# Patient Record
Sex: FEMALE | Race: ASIAN | Employment: UNEMPLOYED | URBAN - METROPOLITAN AREA
[De-identification: names, ages, dates, MRNs, and addresses within clinical notes are randomized per-mention and may not be internally consistent; named-entity substitution may affect disease eponyms.]

---

## 2017-10-14 LAB
AMB EXT ANTIBODY SCREEN: NEGATIVE
AMB EXT CHLAMYDIA NUCLEIC ACID AMP, TMA: NEGATIVE
AMB EXT HBSAG SCREEN: NEGATIVE
AMB EXT HEMATOCRIT: 35.1
AMB EXT HEMOGLOBIN: 11.8
AMB EXT HIV AG AB COMBO: NEGATIVE
AMB EXT MCV: 94.9
AMB EXT PLATELETS: 19.6
AMB EXT RH FACTOR: POSITIVE

## 2018-01-08 ENCOUNTER — OFFICE VISIT (OUTPATIENT)
Dept: OBGYN CLINIC | Facility: CLINIC | Age: 28
End: 2018-01-08

## 2018-01-08 ENCOUNTER — APPOINTMENT (OUTPATIENT)
Dept: OBGYN CLINIC | Facility: CLINIC | Age: 28
End: 2018-01-08

## 2018-01-08 VITALS
HEART RATE: 78 BPM | WEIGHT: 151 LBS | HEIGHT: 65.5 IN | DIASTOLIC BLOOD PRESSURE: 58 MMHG | SYSTOLIC BLOOD PRESSURE: 110 MMHG | BODY MASS INDEX: 24.85 KG/M2

## 2018-01-08 DIAGNOSIS — Z34.02 ENCOUNTER FOR SUPERVISION OF NORMAL FIRST PREGNANCY IN SECOND TRIMESTER: Primary | ICD-10-CM

## 2018-01-08 DIAGNOSIS — Z36.9 ENCOUNTER FOR ANTENATAL SCREENING: ICD-10-CM

## 2018-01-08 LAB
BILIRUBIN: NEGATIVE
GLUCOSE (URINE DIPSTICK): NEGATIVE MG/DL
KETONES (URINE DIPSTICK): NEGATIVE MG/DL
MULTISTIX LOT#: NORMAL NUMERIC
NITRITE, URINE: NEGATIVE
OCCULT BLOOD: NEGATIVE
PH, URINE: 7 (ref 4.5–8)
PROTEIN (URINE DIPSTICK): NEGATIVE MG/DL
SPECIFIC GRAVITY: 1.01 (ref 1–1.03)
URINE-COLOR: YELLOW
UROBILINOGEN,SEMI-QN: 0.2 MG/DL (ref 0–1.9)

## 2018-01-08 PROCEDURE — 81002 URINALYSIS NONAUTO W/O SCOPE: CPT | Performed by: OBSTETRICS & GYNECOLOGY

## 2018-01-08 PROCEDURE — 87086 URINE CULTURE/COLONY COUNT: CPT | Performed by: OBSTETRICS & GYNECOLOGY

## 2018-01-08 PROCEDURE — 99204 OFFICE O/P NEW MOD 45 MIN: CPT | Performed by: OBSTETRICS & GYNECOLOGY

## 2018-01-08 NOTE — PROGRESS NOTES
Patient received prenatal care in [place] and [facility] . By LMP [facility] is 5/5/17 by records from [place]. Has had US  Since with last one in Nov but no structural survey done . OB US ordered  For today. Has had some BW and RH pos. Glucola  And CBC ordered. . rtc 4 wk

## 2018-01-09 LAB
C TRACH DNA SPEC QL NAA+PROBE: NEGATIVE
N GONORRHOEA DNA SPEC QL NAA+PROBE: NEGATIVE

## 2018-01-12 ENCOUNTER — TELEPHONE (OUTPATIENT)
Dept: OBGYN CLINIC | Facility: CLINIC | Age: 28
End: 2018-01-12

## 2018-01-12 NOTE — TELEPHONE ENCOUNTER
PC with spouse Peyton Heller who is on her hippa. Aware a  is needed to verify the lab results they brought us dated 10/14/2017 are his wifes. They are in [facility] and unable to ascertain if these are the patient.  A  is needed to translate the nam

## 2018-01-29 ENCOUNTER — LAB ENCOUNTER (OUTPATIENT)
Dept: LAB | Facility: HOSPITAL | Age: 28
End: 2018-01-29
Attending: OBSTETRICS & GYNECOLOGY
Payer: MEDICAID

## 2018-01-29 DIAGNOSIS — Z36.9 ENCOUNTER FOR ANTENATAL SCREENING: ICD-10-CM

## 2018-01-29 LAB
BASOPHILS # BLD AUTO: 0.01 X10(3) UL (ref 0–0.1)
BASOPHILS NFR BLD AUTO: 0.1 %
EOSINOPHIL # BLD AUTO: 0.03 X10(3) UL (ref 0–0.3)
EOSINOPHIL NFR BLD AUTO: 0.4 %
ERYTHROCYTE [DISTWIDTH] IN BLOOD BY AUTOMATED COUNT: 12.5 % (ref 11.5–16)
GLUCOSE 1H P GLC SERPL-MCNC: 103 MG/DL
HCT VFR BLD AUTO: 32.6 % (ref 34–50)
HGB BLD-MCNC: 10.9 G/DL (ref 12–16)
IMMATURE GRANULOCYTE COUNT: 0.03 X10(3) UL (ref 0–1)
IMMATURE GRANULOCYTE RATIO %: 0.4 %
LYMPHOCYTES # BLD AUTO: 1.52 X10(3) UL (ref 0.9–4)
LYMPHOCYTES NFR BLD AUTO: 21.5 %
MCH RBC QN AUTO: 34 PG (ref 27–33.2)
MCHC RBC AUTO-ENTMCNC: 33.4 G/DL (ref 31–37)
MCV RBC AUTO: 101.6 FL (ref 81–100)
MONOCYTES # BLD AUTO: 0.58 X10(3) UL (ref 0.1–0.6)
MONOCYTES NFR BLD AUTO: 8.2 %
NEUTROPHIL ABS PRELIM: 4.91 X10 (3) UL (ref 1.3–6.7)
NEUTROPHILS # BLD AUTO: 4.91 X10(3) UL (ref 1.3–6.7)
NEUTROPHILS NFR BLD AUTO: 69.4 %
PLATELET # BLD AUTO: 197 10(3)UL (ref 150–450)
RBC # BLD AUTO: 3.21 X10(6)UL (ref 3.8–5.1)
RED CELL DISTRIBUTION WIDTH-SD: 46.3 FL (ref 35.1–46.3)
WBC # BLD AUTO: 7.1 X10(3) UL (ref 4–13)

## 2018-01-29 PROCEDURE — 36415 COLL VENOUS BLD VENIPUNCTURE: CPT

## 2018-01-29 PROCEDURE — 85025 COMPLETE CBC W/AUTO DIFF WBC: CPT

## 2018-01-29 PROCEDURE — 82950 GLUCOSE TEST: CPT

## 2018-01-30 ENCOUNTER — TELEPHONE (OUTPATIENT)
Dept: OBGYN CLINIC | Facility: CLINIC | Age: 28
End: 2018-01-30

## 2018-01-30 NOTE — TELEPHONE ENCOUNTER
Spoke with Patricia Queen regarding billing for DOS 1/8/18 - patient is self-pay and had questions about how discount was applied.  Answered all questions and informed Patricia Queen that if patient would like us to speak with her any further, the HIPAA form needs to be upda

## 2018-01-30 NOTE — TELEPHONE ENCOUNTER
Pt came into the office with a friend whos name is Sherren Roles , gave verbal okay for us to speak with her in regards to  billing and treatment concerns. Pt does not speak english.

## 2018-01-30 NOTE — TELEPHONE ENCOUNTER
Placed call to Melvin Knowles to resolve this matter. Melvin Knowles asked to call me back in a few minutes when Jeanie Lesch is available.

## 2018-02-05 ENCOUNTER — OFFICE VISIT (OUTPATIENT)
Dept: OBGYN CLINIC | Facility: CLINIC | Age: 28
End: 2018-02-05

## 2018-02-05 ENCOUNTER — MED REC SCAN ONLY (OUTPATIENT)
Dept: OBGYN CLINIC | Facility: CLINIC | Age: 28
End: 2018-02-05

## 2018-02-05 VITALS
HEIGHT: 65.5 IN | SYSTOLIC BLOOD PRESSURE: 100 MMHG | WEIGHT: 157 LBS | HEART RATE: 68 BPM | BODY MASS INDEX: 25.84 KG/M2 | DIASTOLIC BLOOD PRESSURE: 64 MMHG

## 2018-02-05 DIAGNOSIS — Z36.9 ENCOUNTER FOR ANTENATAL SCREENING OF MOTHER: Primary | ICD-10-CM

## 2018-02-05 PROCEDURE — 99213 OFFICE O/P EST LOW 20 MIN: CPT | Performed by: OBSTETRICS & GYNECOLOGY

## 2018-02-05 NOTE — PROGRESS NOTES
Patient here without chief complaint she is doing well. She was a bit anemic and will start taking iron. Return to office in 4 weeks.

## 2018-03-05 ENCOUNTER — OFFICE VISIT (OUTPATIENT)
Dept: OBGYN CLINIC | Facility: CLINIC | Age: 28
End: 2018-03-05

## 2018-03-05 VITALS
WEIGHT: 159 LBS | SYSTOLIC BLOOD PRESSURE: 112 MMHG | HEIGHT: 65.5 IN | DIASTOLIC BLOOD PRESSURE: 60 MMHG | BODY MASS INDEX: 26.17 KG/M2 | HEART RATE: 68 BPM

## 2018-03-05 DIAGNOSIS — Z36.89 ENCOUNTER FOR ULTRASOUND TO ASSESS INTERVAL GROWTH OF FETUS: Primary | ICD-10-CM

## 2018-03-05 PROCEDURE — 0502F SUBSEQUENT PRENATAL CARE: CPT | Performed by: OBSTETRICS & GYNECOLOGY

## 2018-03-05 NOTE — PROGRESS NOTES
Doing well no chief complaints. No contractions. Return to office in 2 weeks. Wishes to have another ultrasound done. Order placed.

## 2018-03-19 ENCOUNTER — MED REC SCAN ONLY (OUTPATIENT)
Dept: OBGYN CLINIC | Facility: CLINIC | Age: 28
End: 2018-03-19

## 2018-03-19 ENCOUNTER — APPOINTMENT (OUTPATIENT)
Dept: OBGYN CLINIC | Facility: CLINIC | Age: 28
End: 2018-03-19

## 2018-03-19 ENCOUNTER — OFFICE VISIT (OUTPATIENT)
Dept: OBGYN CLINIC | Facility: CLINIC | Age: 28
End: 2018-03-19

## 2018-03-19 VITALS
BODY MASS INDEX: 26.83 KG/M2 | DIASTOLIC BLOOD PRESSURE: 70 MMHG | HEIGHT: 65.5 IN | SYSTOLIC BLOOD PRESSURE: 110 MMHG | WEIGHT: 163 LBS | HEART RATE: 80 BPM

## 2018-03-19 DIAGNOSIS — Z34.03 ENCOUNTER FOR SUPERVISION OF NORMAL FIRST PREGNANCY IN THIRD TRIMESTER: Primary | ICD-10-CM

## 2018-03-19 DIAGNOSIS — Z36.89 ENCOUNTER FOR ULTRASOUND TO ASSESS INTERVAL GROWTH OF FETUS: ICD-10-CM

## 2018-03-19 PROCEDURE — 0502F SUBSEQUENT PRENATAL CARE: CPT | Performed by: OBSTETRICS & GYNECOLOGY

## 2018-03-19 PROCEDURE — 76816 OB US FOLLOW-UP PER FETUS: CPT | Performed by: OBSTETRICS & GYNECOLOGY

## 2018-03-19 NOTE — PROGRESS NOTES
Ultrasound done today 3/19/2000 1833 weeks 1 day MILLER 5/6/2018 with dates cephalic normal LAUREN.   Return to office in 2 weeks

## 2018-03-30 ENCOUNTER — OFFICE VISIT (OUTPATIENT)
Dept: OBGYN CLINIC | Facility: CLINIC | Age: 28
End: 2018-03-30

## 2018-03-30 VITALS
BODY MASS INDEX: 26.5 KG/M2 | DIASTOLIC BLOOD PRESSURE: 68 MMHG | HEART RATE: 75 BPM | SYSTOLIC BLOOD PRESSURE: 110 MMHG | HEIGHT: 65.5 IN | WEIGHT: 161 LBS

## 2018-03-30 DIAGNOSIS — Z34.03 ENCOUNTER FOR SUPERVISION OF NORMAL FIRST PREGNANCY IN THIRD TRIMESTER: Primary | ICD-10-CM

## 2018-03-30 PROCEDURE — 0502F SUBSEQUENT PRENATAL CARE: CPT | Performed by: OBSTETRICS & GYNECOLOGY

## 2018-04-06 ENCOUNTER — OFFICE VISIT (OUTPATIENT)
Dept: OBGYN CLINIC | Facility: CLINIC | Age: 28
End: 2018-04-06

## 2018-04-06 VITALS
SYSTOLIC BLOOD PRESSURE: 108 MMHG | HEART RATE: 68 BPM | DIASTOLIC BLOOD PRESSURE: 68 MMHG | WEIGHT: 163 LBS | BODY MASS INDEX: 26.83 KG/M2 | HEIGHT: 65.5 IN

## 2018-04-06 DIAGNOSIS — Z36.85 ANTENATAL SCREENING FOR STREPTOCOCCUS B: ICD-10-CM

## 2018-04-06 DIAGNOSIS — Z34.03 ENCOUNTER FOR SUPERVISION OF NORMAL FIRST PREGNANCY IN THIRD TRIMESTER: Primary | ICD-10-CM

## 2018-04-06 PROCEDURE — 0502F SUBSEQUENT PRENATAL CARE: CPT | Performed by: NURSE PRACTITIONER

## 2018-04-06 PROCEDURE — 87653 STREP B DNA AMP PROBE: CPT | Performed by: NURSE PRACTITIONER

## 2018-04-06 PROCEDURE — 87081 CULTURE SCREEN ONLY: CPT | Performed by: NURSE PRACTITIONER

## 2018-04-06 NOTE — PROGRESS NOTES
ALEE  Doing well, +FM.  Denies VB/LOF/uctx  Mode of delivery:  anticipated  Declines Tdap   GBS collected   RTC 1 week

## 2018-04-13 ENCOUNTER — OFFICE VISIT (OUTPATIENT)
Dept: OBGYN CLINIC | Facility: CLINIC | Age: 28
End: 2018-04-13

## 2018-04-13 ENCOUNTER — APPOINTMENT (OUTPATIENT)
Dept: LAB | Age: 28
End: 2018-04-13
Attending: OBSTETRICS & GYNECOLOGY
Payer: MEDICAID

## 2018-04-13 VITALS
HEIGHT: 65.5 IN | WEIGHT: 165 LBS | SYSTOLIC BLOOD PRESSURE: 96 MMHG | DIASTOLIC BLOOD PRESSURE: 58 MMHG | BODY MASS INDEX: 27.16 KG/M2 | HEART RATE: 96 BPM

## 2018-04-13 DIAGNOSIS — Z36.9 ENCOUNTER FOR ANTENATAL SCREENING OF MOTHER: ICD-10-CM

## 2018-04-13 DIAGNOSIS — Z34.03 ENCOUNTER FOR SUPERVISION OF NORMAL FIRST PREGNANCY IN THIRD TRIMESTER: Primary | ICD-10-CM

## 2018-04-13 PROCEDURE — 0502F SUBSEQUENT PRENATAL CARE: CPT | Performed by: OBSTETRICS & GYNECOLOGY

## 2018-04-13 PROCEDURE — 87389 HIV-1 AG W/HIV-1&-2 AB AG IA: CPT

## 2018-04-13 PROCEDURE — 36415 COLL VENOUS BLD VENIPUNCTURE: CPT

## 2018-04-13 NOTE — PROGRESS NOTES
Signs and symptoms of labor discussed. Patient still needs to get HIV done. Return to office in 1 week.

## 2018-04-20 ENCOUNTER — TELEPHONE (OUTPATIENT)
Dept: OBGYN CLINIC | Facility: CLINIC | Age: 28
End: 2018-04-20

## 2018-04-20 ENCOUNTER — OFFICE VISIT (OUTPATIENT)
Dept: OBGYN CLINIC | Facility: CLINIC | Age: 28
End: 2018-04-20

## 2018-04-20 VITALS
DIASTOLIC BLOOD PRESSURE: 58 MMHG | WEIGHT: 167 LBS | HEART RATE: 88 BPM | BODY MASS INDEX: 27.49 KG/M2 | HEIGHT: 65.5 IN | SYSTOLIC BLOOD PRESSURE: 102 MMHG

## 2018-04-20 DIAGNOSIS — O36.5990 POOR FETAL GROWTH AFFECTING MANAGEMENT OF MOTHER, ANTEPARTUM, SINGLE OR UNSPECIFIED FETUS: Primary | ICD-10-CM

## 2018-04-20 DIAGNOSIS — O36.5930 POOR FETAL GROWTH AFFECTING MANAGEMENT OF MOTHER IN THIRD TRIMESTER, SINGLE OR UNSPECIFIED FETUS: ICD-10-CM

## 2018-04-20 DIAGNOSIS — Z34.03 ENCOUNTER FOR SUPERVISION OF NORMAL FIRST PREGNANCY IN THIRD TRIMESTER: Primary | ICD-10-CM

## 2018-04-20 PROCEDURE — 0500F INITIAL PRENATAL CARE VISIT: CPT | Performed by: OBSTETRICS & GYNECOLOGY

## 2018-04-20 RX ORDER — MELATONIN
325
COMMUNITY

## 2018-04-20 NOTE — TELEPHONE ENCOUNTER
Patient aware ob ultrasound is scheduled for 4/23/2018 at 1:00 pm. Needs to drink 8 oz water prior. Understands.

## 2018-04-20 NOTE — TELEPHONE ENCOUNTER
PC with patient. They are unable to schedule US until next Friday. Spoke with central scheduling. It would have to be stat to get her in earlier.

## 2018-04-20 NOTE — TELEPHONE ENCOUNTER
PC with patient. Aware dr wants her ob ultrasound and alla appointment moved up. To have US at hospital and alla appointment is for 4/23/2018.

## 2018-04-23 ENCOUNTER — HOSPITAL ENCOUNTER (OUTPATIENT)
Dept: ULTRASOUND IMAGING | Facility: HOSPITAL | Age: 28
Discharge: HOME | End: 2018-04-23
Attending: OBSTETRICS & GYNECOLOGY
Payer: MEDICAID

## 2018-04-23 ENCOUNTER — OFFICE VISIT (OUTPATIENT)
Dept: OBGYN CLINIC | Facility: CLINIC | Age: 28
End: 2018-04-23

## 2018-04-23 VITALS
BODY MASS INDEX: 27.26 KG/M2 | SYSTOLIC BLOOD PRESSURE: 112 MMHG | WEIGHT: 165.63 LBS | HEART RATE: 80 BPM | HEIGHT: 65.5 IN | DIASTOLIC BLOOD PRESSURE: 66 MMHG

## 2018-04-23 DIAGNOSIS — Z34.03 ENCOUNTER FOR SUPERVISION OF NORMAL FIRST PREGNANCY IN THIRD TRIMESTER: Primary | ICD-10-CM

## 2018-04-23 DIAGNOSIS — O36.5990 POOR FETAL GROWTH AFFECTING MANAGEMENT OF MOTHER, ANTEPARTUM, SINGLE OR UNSPECIFIED FETUS: ICD-10-CM

## 2018-04-23 DIAGNOSIS — O32.2XX0 TRANSVERSE LIE OF FETUS, SINGLE OR UNSPECIFIED FETUS: ICD-10-CM

## 2018-04-23 PROCEDURE — 76805 OB US >/= 14 WKS SNGL FETUS: CPT | Performed by: OBSTETRICS & GYNECOLOGY

## 2018-04-23 PROCEDURE — 76817 TRANSVAGINAL US OBSTETRIC: CPT | Performed by: OBSTETRICS & GYNECOLOGY

## 2018-04-23 PROCEDURE — 0502F SUBSEQUENT PRENATAL CARE: CPT | Performed by: OBSTETRICS & GYNECOLOGY

## 2018-04-23 NOTE — PROGRESS NOTES
Ultrasound done at [facility] on 4/23/2018 size equal dates. Baby is however transverse lie. Discussed with radiologist hard to tell whether it is back up or  back down. Will repeat US next week. Aware if go in labor will be C/S if transverse.  rtc 1

## 2018-04-30 ENCOUNTER — OFFICE VISIT (OUTPATIENT)
Dept: OBGYN CLINIC | Facility: CLINIC | Age: 28
End: 2018-04-30

## 2018-04-30 ENCOUNTER — APPOINTMENT (OUTPATIENT)
Dept: OBGYN CLINIC | Facility: CLINIC | Age: 28
End: 2018-04-30

## 2018-04-30 VITALS
WEIGHT: 170 LBS | SYSTOLIC BLOOD PRESSURE: 122 MMHG | HEIGHT: 65.5 IN | BODY MASS INDEX: 27.98 KG/M2 | DIASTOLIC BLOOD PRESSURE: 64 MMHG | HEART RATE: 84 BPM

## 2018-04-30 DIAGNOSIS — O36.5930 POOR FETAL GROWTH AFFECTING MANAGEMENT OF MOTHER IN THIRD TRIMESTER, SINGLE OR UNSPECIFIED FETUS: Primary | ICD-10-CM

## 2018-04-30 PROCEDURE — 59025 FETAL NON-STRESS TEST: CPT | Performed by: OBSTETRICS & GYNECOLOGY

## 2018-04-30 PROCEDURE — 0502F SUBSEQUENT PRENATAL CARE: CPT | Performed by: OBSTETRICS & GYNECOLOGY

## 2018-04-30 NOTE — PROGRESS NOTES
Clinically s<d. Will setup IOL at around 39 wks . IOL for 5/3 at 930am. Pt aware. NST reactive. US with AC at 7%.  Fetal kick count

## 2018-05-01 ENCOUNTER — TELEPHONE (OUTPATIENT)
Dept: OBGYN UNIT | Facility: HOSPITAL | Age: 28
End: 2018-05-01

## 2018-05-01 NOTE — PROGRESS NOTES
Pt does not speak [place]. Confirmed time of arrival for induction with translating friend, Zane Kehr.  Also encouraged pt to remain npo after midnight the night before, in case baby is not vtx presentation on arrival.

## 2018-05-03 ENCOUNTER — HOSPITAL ENCOUNTER (INPATIENT)
Facility: HOSPITAL | Age: 28
LOS: 3 days | Discharge: HOME | End: 2018-05-06
Attending: OBSTETRICS & GYNECOLOGY | Admitting: OBSTETRICS & GYNECOLOGY
Payer: MEDICAID

## 2018-05-03 ENCOUNTER — HOSPITAL ENCOUNTER (INPATIENT)
Dept: OBGYN CLINIC | Facility: HOSPITAL | Age: 28
Discharge: HOME | End: 2018-05-03
Payer: MEDICAID

## 2018-05-03 PROBLEM — Z34.90 NORMAL PREGNANCY: Status: ACTIVE | Noted: 2018-05-03

## 2018-05-03 PROCEDURE — 3E033VJ INTRODUCTION OF OTHER HORMONE INTO PERIPHERAL VEIN, PERCUTANEOUS APPROACH: ICD-10-PCS | Performed by: OBSTETRICS & GYNECOLOGY

## 2018-05-03 RX ORDER — TERBUTALINE SULFATE 1 MG/ML
INJECTION, SOLUTION SUBCUTANEOUS
Status: COMPLETED
Start: 2018-05-03 | End: 2018-05-03

## 2018-05-03 RX ORDER — SODIUM CHLORIDE, SODIUM LACTATE, POTASSIUM CHLORIDE, CALCIUM CHLORIDE 600; 310; 30; 20 MG/100ML; MG/100ML; MG/100ML; MG/100ML
INJECTION, SOLUTION INTRAVENOUS CONTINUOUS
Status: DISCONTINUED | OUTPATIENT
Start: 2018-05-03 | End: 2018-05-06

## 2018-05-03 RX ORDER — TERBUTALINE SULFATE 1 MG/ML
0.25 INJECTION, SOLUTION SUBCUTANEOUS AS NEEDED
Status: DISCONTINUED | OUTPATIENT
Start: 2018-05-03 | End: 2018-05-06

## 2018-05-03 RX ORDER — EPHEDRINE SULFATE 50 MG/ML
5 INJECTION, SOLUTION INTRAVENOUS AS NEEDED
Status: DISCONTINUED | OUTPATIENT
Start: 2018-05-03 | End: 2018-05-06

## 2018-05-03 RX ORDER — NALBUPHINE HCL 10 MG/ML
2.5 AMPUL (ML) INJECTION
Status: DISCONTINUED | OUTPATIENT
Start: 2018-05-03 | End: 2018-05-06

## 2018-05-03 RX ORDER — IBUPROFEN 600 MG/1
600 TABLET ORAL ONCE AS NEEDED
Status: DISCONTINUED | OUTPATIENT
Start: 2018-05-03 | End: 2018-05-05

## 2018-05-03 RX ORDER — DEXTROSE, SODIUM CHLORIDE, SODIUM LACTATE, POTASSIUM CHLORIDE, AND CALCIUM CHLORIDE 5; .6; .31; .03; .02 G/100ML; G/100ML; G/100ML; G/100ML; G/100ML
INJECTION, SOLUTION INTRAVENOUS AS NEEDED
Status: DISCONTINUED | OUTPATIENT
Start: 2018-05-03 | End: 2018-05-06

## 2018-05-03 RX ORDER — TRISODIUM CITRATE DIHYDRATE AND CITRIC ACID MONOHYDRATE 500; 334 MG/5ML; MG/5ML
30 SOLUTION ORAL AS NEEDED
Status: DISCONTINUED | OUTPATIENT
Start: 2018-05-03 | End: 2018-05-06

## 2018-05-03 NOTE — PROGRESS NOTES
Report given to [facility].  Patient left in stable condition

## 2018-05-03 NOTE — H&P
[facility] Patient Status:  Inpatient    1990 MRN [id number]   Location [address] Attending Humaira Noble MD   Hosp Day # 0 PCP No primary care provider on file.      Subjective:   IOL for

## 2018-05-04 PROCEDURE — 0UQMXZZ REPAIR VULVA, EXTERNAL APPROACH: ICD-10-PCS | Performed by: OBSTETRICS & GYNECOLOGY

## 2018-05-04 PROCEDURE — 0KQM0ZZ REPAIR PERINEUM MUSCLE, OPEN APPROACH: ICD-10-PCS | Performed by: OBSTETRICS & GYNECOLOGY

## 2018-05-04 PROCEDURE — 59409 OBSTETRICAL CARE: CPT | Performed by: OBSTETRICS & GYNECOLOGY

## 2018-05-04 RX ORDER — IBUPROFEN 600 MG/1
600 TABLET ORAL EVERY 6 HOURS
Status: DISCONTINUED | OUTPATIENT
Start: 2018-05-04 | End: 2018-05-05

## 2018-05-04 RX ORDER — ACETAMINOPHEN 500 MG
1000 TABLET ORAL EVERY 6 HOURS PRN
Status: DISCONTINUED | OUTPATIENT
Start: 2018-05-04 | End: 2018-05-06

## 2018-05-04 RX ORDER — DOCUSATE SODIUM 100 MG/1
100 CAPSULE, LIQUID FILLED ORAL
Status: DISCONTINUED | OUTPATIENT
Start: 2018-05-04 | End: 2018-05-06

## 2018-05-04 RX ORDER — ACETAMINOPHEN 325 MG/1
650 TABLET ORAL EVERY 4 HOURS PRN
Status: DISCONTINUED | OUTPATIENT
Start: 2018-05-04 | End: 2018-05-06

## 2018-05-04 RX ORDER — HYDROCODONE BITARTRATE AND ACETAMINOPHEN 5; 325 MG/1; MG/1
1 TABLET ORAL EVERY 4 HOURS PRN
Status: DISCONTINUED | OUTPATIENT
Start: 2018-05-04 | End: 2018-05-06

## 2018-05-04 RX ORDER — BISACODYL 10 MG
10 SUPPOSITORY, RECTAL RECTAL ONCE AS NEEDED
Status: ACTIVE | OUTPATIENT
Start: 2018-05-04 | End: 2018-05-04

## 2018-05-04 RX ORDER — SIMETHICONE 80 MG
80 TABLET,CHEWABLE ORAL 3 TIMES DAILY PRN
Status: DISCONTINUED | OUTPATIENT
Start: 2018-05-04 | End: 2018-05-06

## 2018-05-04 RX ORDER — ZOLPIDEM TARTRATE 5 MG/1
5 TABLET ORAL NIGHTLY PRN
Status: DISCONTINUED | OUTPATIENT
Start: 2018-05-04 | End: 2018-05-06

## 2018-05-04 RX ORDER — ACETAMINOPHEN 500 MG
TABLET ORAL
Status: COMPLETED
Start: 2018-05-04 | End: 2018-05-04

## 2018-05-04 RX ORDER — HYDROCODONE BITARTRATE AND ACETAMINOPHEN 5; 325 MG/1; MG/1
2 TABLET ORAL EVERY 4 HOURS PRN
Status: DISCONTINUED | OUTPATIENT
Start: 2018-05-04 | End: 2018-05-06

## 2018-05-04 NOTE — PROGRESS NOTES
Report given to Alexi Tovar RN. Patient laying left sided with peanut ball in place. She declines need for pain medication at this time.

## 2018-05-04 NOTE — L&D DELIVERY NOTE
Chantell Gordon, Girl  [[id number]]    Labor Events     labor?:  No   steroids?:  None  Antibiotics received during labor?:  Yes  Antibiotics (enter # doses in comment):  ampicillin  Rupture date/time:  5/3/2018 1030     Rupture type:  AROM  Fluid colo Abdominal circum. :  29 cm       Placenta    Date/time:  2018 1203  Removal:  Spontaneous  Appearance:  Intact  Disposition:  held for future pathology     Apgars    Living status:  Living   Apgar Scoring Key:     0 1 2    Skin color Blue or pale A and suctioned. The cord was doubly clamped and cut. The cord blood was sampled. IV pitocin and gentle uterine massage helped delivery of the placenta. The perineal laceration was repaired in layers.   The vaginal portion was approximated with a 2.0 rapi

## 2018-05-04 NOTE — PROGRESS NOTES
Assisted to bathroom by RN and OBT, ambulated with a steady gait, voided and pericare given and pad changed. Pt shown use of tucks and spray, seems to understand.    very supportive at bedside

## 2018-05-04 NOTE — PROGRESS NOTES
Pt and infant daughter admitted to room with fob accompanying. Pt. And fob verbalized understanding to call for nurse when patient wants to get out of the bed for any reason so nurse can assist.  Pt. And S.O.  Verbalized understanding of room number and ho

## 2018-05-04 NOTE — PROGRESS NOTES
Had episode of bradycardia with hyperstim and was given terb. contractin on own at 5 mins. Pit left off for several hours and restarted. On ampicillin since 10 pm  As ROM at 1030am.SVE now 8-9 cm. Pitocin on 8mu. FHT reactive.

## 2018-05-05 RX ORDER — IBUPROFEN 600 MG/1
600 TABLET ORAL EVERY 6 HOURS
Status: DISCONTINUED | OUTPATIENT
Start: 2018-05-05 | End: 2018-05-05

## 2018-05-05 RX ORDER — IBUPROFEN 600 MG/1
600 TABLET ORAL EVERY 6 HOURS SCHEDULED
Status: DISCONTINUED | OUTPATIENT
Start: 2018-05-05 | End: 2018-05-06

## 2018-05-05 NOTE — PLAN OF CARE
Patient/Family Goals    • Patient/Family Long Term Goal Progressing        POSTPARTUM    • Long Term Goal:Experiences normal postpartum course Progressing    • Optimize infant feeding at the breast Progressing    • Appropriate maternal -  bonding Pr

## 2018-05-05 NOTE — PROGRESS NOTES
PPD#1    Pt has no complaints, lochia min   05/05/18  0801   BP: 123/84   Pulse: 92   Resp: 17   Temp: 98.5 °F (36.9 °C)     Recent Labs   Lab  05/03/18   1015  05/05/18   0629   RBC  3.78*  3.36*   HGB  13.0  11.7*   HCT  38.9  34.4   MCV  102.9*  102.4*

## 2018-05-06 VITALS
HEIGHT: 65 IN | HEART RATE: 80 BPM | TEMPERATURE: 99 F | OXYGEN SATURATION: 100 % | RESPIRATION RATE: 18 BRPM | DIASTOLIC BLOOD PRESSURE: 65 MMHG | SYSTOLIC BLOOD PRESSURE: 110 MMHG | WEIGHT: 170 LBS | BODY MASS INDEX: 28.32 KG/M2

## 2018-05-06 NOTE — PROGRESS NOTES
PPD#2    Pt has no complaints, lochia min   05/06/18  0805   BP:    Pulse:    Resp: 18   Temp: 98.7 °F (37.1 °C)     Recent Labs   Lab  05/05/18   0629   RBC  3.36*   HGB  11.7*   HCT  34.4   MCV  102.4*   MCH  34.8*   MCHC  34.0   RDW  12.7   NEPRELIM  10

## 2018-05-09 ENCOUNTER — TELEPHONE (OUTPATIENT)
Dept: OBGYN UNIT | Facility: HOSPITAL | Age: 28
End: 2018-05-09

## 2018-06-13 ENCOUNTER — OFFICE VISIT (OUTPATIENT)
Dept: OBGYN CLINIC | Facility: CLINIC | Age: 28
End: 2018-06-13

## 2018-06-13 VITALS
TEMPERATURE: 98 F | WEIGHT: 146 LBS | SYSTOLIC BLOOD PRESSURE: 112 MMHG | HEART RATE: 72 BPM | DIASTOLIC BLOOD PRESSURE: 64 MMHG | BODY MASS INDEX: 23.46 KG/M2 | RESPIRATION RATE: 12 BRPM | HEIGHT: 66 IN

## 2018-06-13 NOTE — PROGRESS NOTES
Patient is a 32year old female here  for her post-partum exam. She had a   18. She denies any complications, including urinary or bowel complaints. She reports no problems with her breasts. She denies any symptoms of post partum depression.     BP

## 2022-03-23 NOTE — PROGRESS NOTES
Patient is scheduled for ultrasound on Monday for fetal growth. US 4/19 was normal size.  Reschedule ALEE to next Mon with NST Pulse Duration: 10 ms Pulse Duration: 20 ms Fluence In J/Cm2 (Optional): 11.0 Delay Time (Ms): 20 Fluence In J/Cm2 (Optional): 12.5 Spot Size: 7 mm Cryogen Time (Ms): 30 Consent: Written consent obtained, risks reviewed including but not limited to crusting, scabbing, blistering, scarring, darker or lighter pigmentary change, incidental hair removal, bruising, and/or incomplete removal. Post-Procedure Care: Sunscreen Pulse Duration: 6 ms Detail Level: Simple Price (Use Numbers Only, No Special Characters Or $): 150 Post-Care Instructions: I reviewed with the patient in detail post-care instructions. Patient should stay away from the sun and wear sun protection until treated areas are fully healed. Laser Type: Vbeam 595nm Treated Area: small area Spot Size: 10x3 mm